# Patient Record
Sex: FEMALE | URBAN - METROPOLITAN AREA
[De-identification: names, ages, dates, MRNs, and addresses within clinical notes are randomized per-mention and may not be internally consistent; named-entity substitution may affect disease eponyms.]

---

## 2023-02-13 ENCOUNTER — OFFICE VISIT (OUTPATIENT)
Dept: URBAN - METROPOLITAN AREA CLINIC 17 | Facility: CLINIC | Age: 68
End: 2023-02-13
Payer: COMMERCIAL

## 2023-02-13 DIAGNOSIS — H25.013 CORTICAL AGE-RELATED CATARACT, BILATERAL: Primary | ICD-10-CM

## 2023-02-13 PROCEDURE — 99204 OFFICE O/P NEW MOD 45 MIN: CPT | Performed by: OPHTHALMOLOGY

## 2023-02-13 RX ORDER — PREDNISOLONE ACETATE 10 MG/ML
1 % SUSPENSION/ DROPS OPHTHALMIC
Qty: 10 | Refills: 1 | Status: ACTIVE
Start: 2023-02-13

## 2023-02-13 RX ORDER — OFLOXACIN 3 MG/ML
0.3 % SOLUTION/ DROPS OPHTHALMIC
Qty: 5 | Refills: 1 | Status: ACTIVE
Start: 2023-02-13

## 2023-02-13 ASSESSMENT — VISUAL ACUITY
OS: 20/60
OD: 20/40

## 2023-02-13 ASSESSMENT — INTRAOCULAR PRESSURE
OS: 17
OD: 20

## 2023-02-13 ASSESSMENT — KERATOMETRY
OS: 38.00
OD: 37.25

## 2023-02-13 NOTE — IMPRESSION/PLAN
Impression: Cortical age-related cataract, bilateral: H25.013. Condition: established, worsening. Symptoms: could improve with surgery. S/P RK x 4 OD RK x 6 OS Plan: Cataract accounts for patient's complaints. Reviewed risks, benefits, and procedure. Patient desires surgery, schedule ce/iol OS, RL2, consider vivity lens or standard lens, distance refractive target, patient is clear for surgery in [place] [id number]. Discussed with patient the possible need for second surgery due to history of RK. Recommend ORA to help prevent secondary surgery.  
Monitor OD